# Patient Record
(demographics unavailable — no encounter records)

---

## 2025-03-10 NOTE — DISCUSSION/SUMMARY
[FreeTextEntry1] : 67 YEAR OLD FEMALE LMP 2007 PRESENTS FOR WELL WOMAN GYNECOLOGIC EXAMINATION AND PAP. LAST SEEN FOR WELL WOMAN VISIT 9/13/2022; PAP AND HPV HR TESTING DONE AT THAT TIME WERE NEGATIVE.  NO NEW MEDICAL OR SURGICAL HISTORY SINCE LAST VISIT. MEDICATIONS; PROVENTIL INHALER PRN                            PULMOCORT                            ROSUVASTATIN                            LOSARTAN MEDICATION ALLERGIES; NONE ROS;BMS-NORMAL ; NO FAM HISTORY OF COLON CANCER; LAST COLONOSCOPY 3 1/2 YRS AGO          + OCCASIONAL MILD STRESS URINARY INCONTINENCE           SEXUALLY ACTIVE VERY INFREQUENTLY WITH DRYNESS AND DISCOMFORT BREASTS; NOT DOING BREAST SELF EXAMINATION                    MAMMOGPRAHY AND BREAST US AND SAW DR. DERRICK CUTLER 12/2024                    NO FAM HISTORY OF BREAST CANCER + EXERCISE; + MULTIIVITAMINS; + DAIRY/YOGURT/CHEESE; NO TOBACCO OR VAPING LAST BONE DENSITY TESTING DONE 3/10/2025 ,TODAY WITH LOSS, OSTEOPENIA , AT HIS WITH T                     SCORE -2.2 AT FEMORAL NECK 2.4% WORSENED OVER 30 MONTHS. NORMAL AT LS  PE;/72; TEMP 96.2P; WEIGHT 138 LBS HEIGHT 5'6"        BREASTS; NO MASSES OR DISCHARGES        ABDOMSN;SOFT, NO MASSES; NO TENDERNESS TO PALPATION        PELVIC; NORMAL EXTERNAL GENITALIA                       NORMAL URETHRAL M,EATUS                       NORMAL LABIA MAJORA AND LABIA MINORA                       NORMAL CERVIX WITHOUT LESIONLSLIGHTLY  STENOTIC OS                       3RD DEGREE SMALL RETROVERTED UTERUS ON  BIMANUAL EXAMINATION                       NO PALPABLE ADNEXAL MASSES  IMP; WELL WOMAN          MENOPAUSE           HYPERTENSION           MILD STRESS URINARY INCONTINENCE           OSTEOPENIA           PLAN; THIN PREP PAP WITH HR HPV TESTING DONE-PT TO CALL IN 2 WKS FOR RESULTS             ANNUAL MAMMOGRPAHY AND BREAST US WITH BREAST SURGEON DR. DERRICK CUTLER             MONTHLY BREAST SELF EXAMINTION CONITNUED TO BE STRONGLY ADVSIED             RETURN TO OFFICE ONE YEAR OR PRN